# Patient Record
Sex: FEMALE
[De-identification: names, ages, dates, MRNs, and addresses within clinical notes are randomized per-mention and may not be internally consistent; named-entity substitution may affect disease eponyms.]

---

## 2022-02-09 PROBLEM — Z00.00 ENCOUNTER FOR PREVENTIVE HEALTH EXAMINATION: Status: ACTIVE | Noted: 2022-02-09

## 2022-03-07 ENCOUNTER — APPOINTMENT (OUTPATIENT)
Dept: BARIATRICS/WEIGHT MGMT | Facility: CLINIC | Age: 52
End: 2022-03-07
Payer: COMMERCIAL

## 2022-03-07 VITALS — WEIGHT: 155 LBS | HEIGHT: 63 IN | BODY MASS INDEX: 27.46 KG/M2

## 2022-03-07 DIAGNOSIS — Z86.79 PERSONAL HISTORY OF OTHER DISEASES OF THE CIRCULATORY SYSTEM: ICD-10-CM

## 2022-03-07 DIAGNOSIS — Z63.5 DISRUPTION OF FAMILY BY SEPARATION AND DIVORCE: ICD-10-CM

## 2022-03-07 DIAGNOSIS — Z87.898 PERSONAL HISTORY OF OTHER SPECIFIED CONDITIONS: ICD-10-CM

## 2022-03-07 DIAGNOSIS — Z78.9 OTHER SPECIFIED HEALTH STATUS: ICD-10-CM

## 2022-03-07 DIAGNOSIS — R63.5 ABNORMAL WEIGHT GAIN: ICD-10-CM

## 2022-03-07 DIAGNOSIS — E66.3 OVERWEIGHT: ICD-10-CM

## 2022-03-07 PROCEDURE — 99203 OFFICE O/P NEW LOW 30 MIN: CPT | Mod: 95

## 2022-03-07 RX ORDER — OMEPRAZOLE 20 MG/1
20 TABLET, DELAYED RELEASE ORAL
Refills: 0 | Status: ACTIVE | COMMUNITY

## 2022-03-07 RX ORDER — RIMEGEPANT SULFATE 75 MG/75MG
75 TABLET, ORALLY DISINTEGRATING ORAL
Refills: 0 | Status: ACTIVE | COMMUNITY

## 2022-03-07 RX ORDER — DEXAMETHASONE 1 MG/1
1 TABLET ORAL
Qty: 1 | Refills: 0 | Status: ACTIVE | COMMUNITY
Start: 2022-03-07 | End: 1900-01-01

## 2022-03-07 SDOH — SOCIAL STABILITY - SOCIAL INSECURITY: DISRUPTION OF FAMILY BY SEPARATION AND DIVORCE: Z63.5

## 2022-03-07 NOTE — ASSESSMENT
[FreeTextEntry1] : Diagnosis: Overweight and recent wt gain\par Dietary Modification Education provided. Recommend a diet high in vegetables intake & lean protein. Recommend eating complex carbs instead of simple carbs. \par Recommend Tracking dietary intake daily to increase your awareness of dietary intake. \par RD referral- for additional recommendations on dietary modification\par Physical Activity- recommend aerobic exercise 3-4 times per week for 30-45 mins, recommend incorporating strength training 3 times per week- recommend getting light weights and doing free online strength training videos\par Labs- ordered fasting labs to evaluate for any medical reason for weight gain and inability to lose weight\par Medication- pt  does not meet criteria to initiate obesity treatment\par RTO in 2-4 weeks for lab review & next steps\par \par

## 2022-03-07 NOTE — HISTORY OF PRESENT ILLNESS
[Other Location: e.g. School (Enter Location, City,State)___] : at [unfilled], at the time of the visit. [Other Location: e.g. Home (Enter Location, City,State)___] : at [unfilled] [Verbal consent obtained from patient] : the patient, [unfilled] [FreeTextEntry1] : 50 y/o Female- here for medical weight loss consultation\par Athletic her whole life, started gaining weight over the last year and unable to lose weight, gained 10-15 lbs\par Medical Hx: headaches, abnormal heart rhythm, Overweight, + perimenopausal\par Past Weight Loss Attempts: WW, Aerobic exercise, Tried topiramate- first time worked well, didn't help with weight loss the last time she took it.\par Exercises 3 x per week- walks daily, plays tennis in the Spring & Fall, used the [employer] during the winter\par Food Recall: B-granola OR triscuits OR oatmeal, L- salad with chicken & balsamic & vinegar dressing, D- salad, snacks on chips, nuts, pretzels, popcorn\par Water Intake- 64 + ounces daily\par Stress Level- low\par Sleep- 7 hours per night\par Labs- plans to have labs drawn this Wednesday\par

## 2023-02-07 ENCOUNTER — NON-APPOINTMENT (OUTPATIENT)
Age: 53
End: 2023-02-07

## 2023-02-08 ENCOUNTER — NON-APPOINTMENT (OUTPATIENT)
Age: 53
End: 2023-02-08

## 2023-02-10 ENCOUNTER — APPOINTMENT (OUTPATIENT)
Dept: OBGYN | Facility: CLINIC | Age: 53
End: 2023-02-10
Payer: COMMERCIAL

## 2023-02-10 VITALS
DIASTOLIC BLOOD PRESSURE: 80 MMHG | HEIGHT: 63 IN | WEIGHT: 150 LBS | BODY MASS INDEX: 26.58 KG/M2 | SYSTOLIC BLOOD PRESSURE: 110 MMHG

## 2023-02-10 DIAGNOSIS — Z12.31 ENCOUNTER FOR SCREENING MAMMOGRAM FOR MALIGNANT NEOPLASM OF BREAST: ICD-10-CM

## 2023-02-10 PROCEDURE — 99386 PREV VISIT NEW AGE 40-64: CPT

## 2023-02-10 NOTE — HISTORY OF PRESENT ILLNESS
[TextBox_4] : 53yo P2 here for new gyn.\par Having slight bleeding once a month like a period after removing Mirena about a  year ago.\par Has mild hot flashes; trouble losing weight despite diet and exercise- walks dogs twice a day and uses her [employer].  Had a consult for medical treatment but it would be out of pocket cost so opted not to.  She is taking Wellbutrin but that hasn't helped her with her weight.\par \par She is ; in a longterm relationship.  She has 2 sons who are 18 and 20yo.  Younger is graduating high school this year.

## 2023-02-11 LAB — HPV HIGH+LOW RISK DNA PNL CVX: NOT DETECTED

## 2023-02-15 LAB — CYTOLOGY CVX/VAG DOC THIN PREP: NORMAL

## 2023-03-15 ENCOUNTER — APPOINTMENT (OUTPATIENT)
Dept: OBGYN | Facility: CLINIC | Age: 53
End: 2023-03-15

## 2023-04-06 ENCOUNTER — RESULT REVIEW (OUTPATIENT)
Age: 53
End: 2023-04-06

## 2023-04-17 ENCOUNTER — RESULT REVIEW (OUTPATIENT)
Age: 53
End: 2023-04-17

## 2023-05-10 ENCOUNTER — TRANSCRIPTION ENCOUNTER (OUTPATIENT)
Age: 53
End: 2023-05-10

## 2023-05-31 ENCOUNTER — APPOINTMENT (OUTPATIENT)
Dept: OBGYN | Facility: CLINIC | Age: 53
End: 2023-05-31
Payer: COMMERCIAL

## 2023-05-31 ENCOUNTER — ASOB RESULT (OUTPATIENT)
Age: 53
End: 2023-05-31

## 2023-05-31 VITALS
BODY MASS INDEX: 26.58 KG/M2 | HEIGHT: 63 IN | DIASTOLIC BLOOD PRESSURE: 84 MMHG | WEIGHT: 150 LBS | SYSTOLIC BLOOD PRESSURE: 110 MMHG

## 2023-05-31 DIAGNOSIS — Z30.430 ENCOUNTER FOR INSERTION OF INTRAUTERINE CONTRACEPTIVE DEVICE: ICD-10-CM

## 2023-05-31 PROCEDURE — 58300 INSERT INTRAUTERINE DEVICE: CPT

## 2023-05-31 NOTE — PROCEDURE
[IUD Placement] : intrauterine device (IUD) placement [Prevention of Pregnancy] : prevention of pregnancy [Risks] : risks [Benefits] : benefits [Alternatives] : alternatives [Patient] : patient [Infection] : infection [Bleeding] : bleeding [Pain] : pain [Expulsion] : expulsion [No Premedication] : No premedication [Tenaculum] : Tenaculum [Required Dilation] : required dilation [Sounded to ___ cm] : sounded to [unfilled] ~Ucm [Post Placement Transvag. US] : post placement transvaginal ultrasound [Mirena IUD] : Mirena IUD [Tolerated Well] : Patient tolerated the procedure well [No Complications] : No complications [None] : None [de-identified] : [id number] [de-identified] : 4/2025 [de-identified] : 05/31/2028

## 2023-05-31 NOTE — ASSESSMENT
[FreeTextEntry1] : 52 yr old who still has monthly menses desires IUD placement.  Patient was counselled for removal in 2 years as she was close to menopause or removal in 5 years at age 57yrs\par \par LMP 4/2023

## 2023-06-01 ENCOUNTER — APPOINTMENT (OUTPATIENT)
Dept: OBGYN | Facility: CLINIC | Age: 53
End: 2023-06-01
Payer: COMMERCIAL

## 2023-06-01 PROCEDURE — 76830 TRANSVAGINAL US NON-OB: CPT

## 2023-06-06 ENCOUNTER — ASOB RESULT (OUTPATIENT)
Age: 53
End: 2023-06-06

## 2023-06-06 LAB
C TRACH RRNA SPEC QL NAA+PROBE: NOT DETECTED
N GONORRHOEA RRNA SPEC QL NAA+PROBE: NOT DETECTED
SOURCE AMPLIFICATION: NORMAL

## 2023-07-19 ENCOUNTER — APPOINTMENT (OUTPATIENT)
Dept: OBGYN | Facility: CLINIC | Age: 53
End: 2023-07-19

## 2024-03-08 ENCOUNTER — APPOINTMENT (OUTPATIENT)
Dept: OBGYN | Facility: CLINIC | Age: 54
End: 2024-03-08
Payer: COMMERCIAL

## 2024-03-08 VITALS
SYSTOLIC BLOOD PRESSURE: 90 MMHG | DIASTOLIC BLOOD PRESSURE: 50 MMHG | BODY MASS INDEX: 23.74 KG/M2 | WEIGHT: 134 LBS | HEIGHT: 63 IN

## 2024-03-08 DIAGNOSIS — R92.30 DENSE BREASTS, UNSPECIFIED: ICD-10-CM

## 2024-03-08 DIAGNOSIS — Z01.419 ENCOUNTER FOR GYNECOLOGICAL EXAMINATION (GENERAL) (ROUTINE) W/OUT ABNORMAL FINDINGS: ICD-10-CM

## 2024-03-08 DIAGNOSIS — Z12.39 ENCOUNTER FOR OTHER SCREENING FOR MALIGNANT NEOPLASM OF BREAST: ICD-10-CM

## 2024-03-08 PROCEDURE — 99396 PREV VISIT EST AGE 40-64: CPT

## 2024-03-08 NOTE — PHYSICAL EXAM
[Chaperone Present] : A chaperone was present in the examining room during all aspects of the physical examination [FreeTextEntry1] : Lana Boyce [Alert] : alert [Appropriately responsive] : appropriately responsive [No Acute Distress] : no acute distress [Soft] : soft [Non-tender] : non-tender [Non-distended] : non-distended [Oriented x3] : oriented x3 [Examination Of The Breasts] : a normal appearance [No Masses] : no breast masses were palpable [Labia Majora] : normal [Labia Minora] : normal [Tenderness] : nontender [Normal] : normal [Enlarged ___ wks] : not enlarged [Uterine Adnexae] : non-palpable

## 2024-03-08 NOTE — HISTORY OF PRESENT ILLNESS
[FreeTextEntry1] : 52 y/o  here for annual.  Had some spotting in November - no bleeding since.  Had Mirena IUD placed 5/31/23 - seen next day for US and not visualized, expelled spontaneously.  h/o migraines, takes topomax daily.  Had colonoscopy at [facility] when 51, told normal and did not need another for 10 years.  No vaginal complaints, pelvic pain, discharge, or urinary complaints.

## 2024-06-03 ENCOUNTER — RESULT REVIEW (OUTPATIENT)
Age: 54
End: 2024-06-03

## 2025-01-16 ENCOUNTER — NON-APPOINTMENT (OUTPATIENT)
Age: 55
End: 2025-01-16

## 2025-04-15 ENCOUNTER — APPOINTMENT (OUTPATIENT)
Dept: OBGYN | Facility: CLINIC | Age: 55
End: 2025-04-15

## 2025-04-17 ENCOUNTER — APPOINTMENT (OUTPATIENT)
Dept: OBGYN | Facility: CLINIC | Age: 55
End: 2025-04-17
Payer: COMMERCIAL

## 2025-04-17 VITALS
WEIGHT: 130 LBS | HEIGHT: 63 IN | DIASTOLIC BLOOD PRESSURE: 60 MMHG | BODY MASS INDEX: 23.04 KG/M2 | SYSTOLIC BLOOD PRESSURE: 100 MMHG

## 2025-04-17 DIAGNOSIS — K64.9 UNSPECIFIED HEMORRHOIDS: ICD-10-CM

## 2025-04-17 DIAGNOSIS — L29.2 PRURITUS VULVAE: ICD-10-CM

## 2025-04-17 PROCEDURE — 99459 PELVIC EXAMINATION: CPT

## 2025-04-17 PROCEDURE — 99213 OFFICE O/P EST LOW 20 MIN: CPT

## 2025-04-17 RX ORDER — HYDROCORTISONE ACETATE 10 MG/G
1 OINTMENT TOPICAL TWICE DAILY
Qty: 1 | Refills: 1 | Status: ACTIVE | COMMUNITY
Start: 2025-04-17 | End: 1900-01-01

## 2025-04-17 RX ORDER — CLOTRIMAZOLE AND BETAMETHASONE DIPROPIONATE 10; .5 MG/G; MG/G
1-0.05 CREAM TOPICAL TWICE DAILY
Qty: 1 | Refills: 0 | Status: ACTIVE | COMMUNITY
Start: 2025-04-17 | End: 1900-01-01

## 2025-04-18 LAB
HSV+VZV DNA SPEC QL NAA+PROBE: NOT DETECTED
SPECIMEN SOURCE: NORMAL
